# Patient Record
Sex: FEMALE | URBAN - METROPOLITAN AREA
[De-identification: names, ages, dates, MRNs, and addresses within clinical notes are randomized per-mention and may not be internally consistent; named-entity substitution may affect disease eponyms.]

---

## 2024-01-26 ENCOUNTER — OFFICE VISIT (OUTPATIENT)
Dept: URBAN - METROPOLITAN AREA CLINIC 42 | Facility: CLINIC | Age: 24
End: 2024-01-26

## 2024-02-09 ENCOUNTER — TELNP (OUTPATIENT)
Dept: URBAN - METROPOLITAN AREA TELEHEALTH 2 | Facility: TELEHEALTH | Age: 24
End: 2024-02-09
Payer: COMMERCIAL

## 2024-02-09 VITALS — HEIGHT: 62 IN | BODY MASS INDEX: 23.74 KG/M2 | WEIGHT: 129 LBS

## 2024-02-09 DIAGNOSIS — R10.84 GENERALIZED ABDOMINAL PAIN: ICD-10-CM

## 2024-02-09 DIAGNOSIS — R14.0 ABDOMINAL BLOATING: ICD-10-CM

## 2024-02-09 PROCEDURE — 99443 PHONE E/M BY PHYS 21-30 MIN: CPT | Performed by: INTERNAL MEDICINE

## 2024-02-09 PROCEDURE — 99244 OFF/OP CNSLTJ NEW/EST MOD 40: CPT | Performed by: INTERNAL MEDICINE

## 2024-02-09 RX ORDER — NORGESTIMATE AND ETHINYL ESTRADIOL 7DAYSX3 28
1 TABLET KIT ORAL ONCE A DAY
Status: ACTIVE | COMMUNITY
Start: 2022-09-28

## 2024-02-09 RX ORDER — SPIRONOLACTONE 50 MG/1
1 TABLET TABLET, FILM COATED ORAL ONCE A DAY
Status: ACTIVE | COMMUNITY
Start: 2022-09-28

## 2024-02-09 NOTE — HPI-TODAY'S VISIT:
This patient was referred by Dr. Walker Long for an evaluation of abdominal pain and loss of appetite.  A copy of this will be sent to the referring provider.  These symptoms have been going on for about 6 months.  The pain is located in the lower abdomen.  She also complains of bloating.  The patient had a breath test that was negative for H. pylori.Also has a history of chronic intermittent constipation/diarrhea.  Also has low appetite and early satiety.  No family hx of GI malignancy or IBD.  The patient also has PCOS and has had a lot of intentional weight loss.  Also has had GYN problems such as painful intercourse, vaginismus, frequent UTIs/bacterial vaginosis and was told that she has some pelvic dysfunction and does see pelvic PT.

## 2024-03-06 ENCOUNTER — EGD (OUTPATIENT)
Dept: URBAN - METROPOLITAN AREA SURGERY CENTER 13 | Facility: SURGERY CENTER | Age: 24
End: 2024-03-06